# Patient Record
Sex: FEMALE | Race: WHITE | HISPANIC OR LATINO | URBAN - METROPOLITAN AREA
[De-identification: names, ages, dates, MRNs, and addresses within clinical notes are randomized per-mention and may not be internally consistent; named-entity substitution may affect disease eponyms.]

---

## 2023-12-28 ENCOUNTER — OFFICE (OUTPATIENT)
Dept: URBAN - METROPOLITAN AREA CLINIC 34 | Facility: CLINIC | Age: 78
End: 2023-12-28

## 2023-12-28 VITALS
HEIGHT: 57 IN | TEMPERATURE: 97.5 F | DIASTOLIC BLOOD PRESSURE: 68 MMHG | HEART RATE: 80 BPM | WEIGHT: 138 LBS | SYSTOLIC BLOOD PRESSURE: 131 MMHG

## 2023-12-28 DIAGNOSIS — M06.9 RHEUMATOID ARTHRITIS, UNSPECIFIED: ICD-10-CM

## 2023-12-28 DIAGNOSIS — K74.60 UNSPECIFIED CIRRHOSIS OF LIVER: ICD-10-CM

## 2023-12-28 DIAGNOSIS — R74.8 ABNORMAL LEVELS OF OTHER SERUM ENZYMES: ICD-10-CM

## 2023-12-28 DIAGNOSIS — Z90.5 ACQUIRED ABSENCE OF KIDNEY: ICD-10-CM

## 2023-12-28 PROCEDURE — 99204 OFFICE O/P NEW MOD 45 MIN: CPT | Performed by: PHYSICIAN ASSISTANT

## 2024-01-17 ENCOUNTER — OFFICE (OUTPATIENT)
Dept: URBAN - METROPOLITAN AREA CLINIC 102 | Facility: CLINIC | Age: 79
End: 2024-01-17

## 2024-01-17 DIAGNOSIS — R74.8 ABNORMAL LEVELS OF OTHER SERUM ENZYMES: ICD-10-CM

## 2024-01-17 DIAGNOSIS — K74.60 UNSPECIFIED CIRRHOSIS OF LIVER: ICD-10-CM

## 2024-01-17 PROCEDURE — 76981 USE PARENCHYMA: CPT | Performed by: PHYSICIAN ASSISTANT

## 2024-02-22 LAB
ALPHA FETOPROTEIN, TUMOR MARKER: 4.4 NG/ML
ALPHA-1-ANTITRYPSIN QN: 165 MG/DL (ref 83–199)
ANA SCREEN, IFA, W/REFL TITER AND PATTERN: ANA SCREEN, IFA: POSITIVE
ANTINUCLEAR ANTIBODIES   TITER AND PATTERN: ANA PATTERN: ABNORMAL
ANTINUCLEAR ANTIBODIES   TITER AND PATTERN: ANA PATTERN: ABNORMAL
ANTINUCLEAR ANTIBODIES   TITER AND PATTERN: ANA PATTERN: NORMAL
ANTINUCLEAR ANTIBODIES   TITER AND PATTERN: ANA TITER: HIGH TITER
ANTINUCLEAR ANTIBODIES   TITER AND PATTERN: ANA TITER: HIGH TITER
ANTINUCLEAR ANTIBODIES   TITER AND PATTERN: ANA TITER: NORMAL TITER
CBC (INCLUDES DIFF/PLT): ABSOLUTE BAND NEUTROPHILS: NORMAL CELLS/UL
CBC (INCLUDES DIFF/PLT): ABSOLUTE BASOPHILS: 40 CELLS/UL (ref 0–200)
CBC (INCLUDES DIFF/PLT): ABSOLUTE BLASTS: NORMAL CELLS/UL
CBC (INCLUDES DIFF/PLT): ABSOLUTE EOSINOPHILS: 180 CELLS/UL (ref 15–500)
CBC (INCLUDES DIFF/PLT): ABSOLUTE LYMPHOCYTES: 1170 CELLS/UL (ref 850–3900)
CBC (INCLUDES DIFF/PLT): ABSOLUTE METAMYELOCYTES: NORMAL CELLS/UL
CBC (INCLUDES DIFF/PLT): ABSOLUTE MONOCYTES: 364 CELLS/UL (ref 200–950)
CBC (INCLUDES DIFF/PLT): ABSOLUTE MYELOCYTES: NORMAL CELLS/UL
CBC (INCLUDES DIFF/PLT): ABSOLUTE NEUTROPHILS: 1847 CELLS/UL (ref 1500–7800)
CBC (INCLUDES DIFF/PLT): ABSOLUTE NUCLEATED RBC: NORMAL CELLS/UL
CBC (INCLUDES DIFF/PLT): ABSOLUTE PROMYELOCYTES: NORMAL CELLS/UL
CBC (INCLUDES DIFF/PLT): BAND NEUTROPHILS: NORMAL %
CBC (INCLUDES DIFF/PLT): BASOPHILS: 1.1 %
CBC (INCLUDES DIFF/PLT): BLASTS: NORMAL %
CBC (INCLUDES DIFF/PLT): COMMENT(S): NORMAL
CBC (INCLUDES DIFF/PLT): EOSINOPHILS: 5 %
CBC (INCLUDES DIFF/PLT): HEMATOCRIT: 32.2 % — LOW (ref 35–45)
CBC (INCLUDES DIFF/PLT): HEMOGLOBIN: 10.9 G/DL — LOW (ref 11.7–15.5)
CBC (INCLUDES DIFF/PLT): LYMPHOCYTES: 32.5 %
CBC (INCLUDES DIFF/PLT): MCH: 31.2 PG (ref 27–33)
CBC (INCLUDES DIFF/PLT): MCHC: 33.9 G/DL (ref 32–36)
CBC (INCLUDES DIFF/PLT): MCV: 92.3 FL (ref 80–100)
CBC (INCLUDES DIFF/PLT): METAMYELOCYTES: NORMAL %
CBC (INCLUDES DIFF/PLT): MONOCYTES: 10.1 %
CBC (INCLUDES DIFF/PLT): MPV: 11.1 FL (ref 7.5–12.5)
CBC (INCLUDES DIFF/PLT): MYELOCYTES: NORMAL %
CBC (INCLUDES DIFF/PLT): NEUTROPHILS: 51.3 %
CBC (INCLUDES DIFF/PLT): NUCLEATED RBC: NORMAL /100 WBC
CBC (INCLUDES DIFF/PLT): PLATELET COUNT: 190 THOUSAND/UL (ref 140–400)
CBC (INCLUDES DIFF/PLT): PROMYELOCYTES: NORMAL %
CBC (INCLUDES DIFF/PLT): RDW: 12.7 % (ref 11–15)
CBC (INCLUDES DIFF/PLT): REACTIVE LYMPHOCYTES: NORMAL %
CBC (INCLUDES DIFF/PLT): RED BLOOD CELL COUNT: 3.49 MILLION/UL — LOW (ref 3.8–5.1)
CBC (INCLUDES DIFF/PLT): WHITE BLOOD CELL COUNT: 3.6 THOUSAND/UL — LOW (ref 3.8–10.8)
CELIAC DISEASE COMPREHENSIVE PANEL: IMMUNOGLOBULIN A: 374 MG/DL — HIGH (ref 70–320)
CELIAC DISEASE COMPREHENSIVE PANEL: INTERPRETATION: (no result)
CELIAC DISEASE COMPREHENSIVE PANEL: TISSUE TRANSGLUTAMINASE AB, IGA: <1 U/ML
COMPREHENSIVE METABOLIC PANEL: ALBUMIN/GLOBULIN RATIO: 0.8 (CALC) — LOW (ref 1–2.5)
COMPREHENSIVE METABOLIC PANEL: ALBUMIN: 3.9 G/DL (ref 3.6–5.1)
COMPREHENSIVE METABOLIC PANEL: ALKALINE PHOSPHATASE: 95 U/L (ref 37–153)
COMPREHENSIVE METABOLIC PANEL: ALT: 40 U/L — HIGH (ref 6–29)
COMPREHENSIVE METABOLIC PANEL: AST: 69 U/L — HIGH (ref 10–35)
COMPREHENSIVE METABOLIC PANEL: BILIRUBIN, TOTAL: 0.7 MG/DL (ref 0.2–1.2)
COMPREHENSIVE METABOLIC PANEL: BUN/CREATININE RATIO: (no result) (CALC)
COMPREHENSIVE METABOLIC PANEL: CALCIUM: 9.3 MG/DL (ref 8.6–10.4)
COMPREHENSIVE METABOLIC PANEL: CARBON DIOXIDE: 24 MMOL/L (ref 20–32)
COMPREHENSIVE METABOLIC PANEL: CHLORIDE: 104 MMOL/L (ref 98–110)
COMPREHENSIVE METABOLIC PANEL: CREATININE: 0.89 MG/DL (ref 0.6–0.95)
COMPREHENSIVE METABOLIC PANEL: EGFR: 65 ML/MIN/1.73M2 (ref 60–?)
COMPREHENSIVE METABOLIC PANEL: GLOBULIN: 4.8 G/DL (CALC) — HIGH (ref 1.9–3.7)
COMPREHENSIVE METABOLIC PANEL: GLUCOSE: 88 MG/DL (ref 65–99)
COMPREHENSIVE METABOLIC PANEL: POTASSIUM: 4.1 MMOL/L (ref 3.5–5.3)
COMPREHENSIVE METABOLIC PANEL: PROTEIN, TOTAL: 8.7 G/DL — HIGH (ref 6.1–8.1)
COMPREHENSIVE METABOLIC PANEL: SODIUM: 135 MMOL/L (ref 135–146)
COMPREHENSIVE METABOLIC PANEL: UREA NITROGEN (BUN): 13 MG/DL (ref 7–25)
HEPATITIS A AB, TOTAL: REACTIVE
HEPATITIS A IGM: NORMAL
HEPATITIS B CORE AB TOTAL: NORMAL
HEPATITIS B CORE ANTIBODY (IGM): HEPATITIS B CORE IGM ANTIBODY: NORMAL
HEPATITIS B SURFACE AB IMMUNITY, QN: <5 MIU/ML — LOW
HEPATITIS B SURFACE ANTIGEN W/REFL CONFIRM: CONFIRMATION: NORMAL
HEPATITIS B SURFACE ANTIGEN W/REFL CONFIRM: HEPATITIS B SURFACE ANTIGEN: NORMAL
HEPATITIS C AB W/REFL TO HCV RNA, QN, PCR: HEPATITIS C ANTIBODY: NORMAL
IRON, TIBC AND FERRITIN PANEL: % SATURATION: 38 % (CALC) (ref 16–45)
IRON, TIBC AND FERRITIN PANEL: FERRITIN: 85 NG/ML (ref 16–288)
IRON, TIBC AND FERRITIN PANEL: IRON BINDING CAPACITY: 313 MCG/DL (CALC) (ref 250–450)
IRON, TIBC AND FERRITIN PANEL: IRON, TOTAL: 119 MCG/DL (ref 45–160)
LIVER KIDNEY MICROSOME (LKM-1) AB (IGG): LKM-1 ANTIBODY (IGG): <=20 U
MITOCHONDRIAL AB TITER: HIGH TITER
MITOCHONDRIAL ANTIBODY W/REFL TITER: MITOCHONDRIAL AB SCREEN: POSITIVE
PROTHROMBIN TIME-INR: INR: 1.1
PROTHROMBIN TIME-INR: PT: 11.4 SEC (ref 9–11.5)
SMOOTH MUSCLE AB W/REFL TITER: SMOOTH MUSCLE AB SCREEN: NEGATIVE
T4, FREE: 1.1 NG/DL (ref 0.8–1.8)
TSH: 4.83 MIU/L — HIGH (ref 0.4–4.5)

## 2024-04-18 ENCOUNTER — TELEHEALTH PROVIDED OTHER THAN IN PATIENT'S HOME (OUTPATIENT)
Dept: URBAN - METROPOLITAN AREA TELEHEALTH 12 | Facility: TELEHEALTH | Age: 79
End: 2024-04-18

## 2024-04-18 VITALS — HEIGHT: 57 IN | WEIGHT: 138 LBS

## 2024-04-18 DIAGNOSIS — R74.8 ABNORMAL LEVELS OF OTHER SERUM ENZYMES: ICD-10-CM

## 2024-04-18 DIAGNOSIS — M06.9 RHEUMATOID ARTHRITIS, UNSPECIFIED: ICD-10-CM

## 2024-04-18 DIAGNOSIS — K74.60 UNSPECIFIED CIRRHOSIS OF LIVER: ICD-10-CM

## 2024-04-18 PROCEDURE — 99214 OFFICE O/P EST MOD 30 MIN: CPT | Mod: 95 | Performed by: INTERNAL MEDICINE

## 2024-04-18 NOTE — SERVICEHPINOTES
PATIENT VERIFIED BY DATE OF BIRTH AND NAME. Patient has been consented for this telecommunication visit using [employer] application.  Bloodwork revealed AMANDA (1:1280 cytoplasmic) and AMA (1:320) positivity, Hb 10.9, TSH 4.83, normal iron sats.  ASMA, A1A, anti LKM negative. Normal INR and platelets.  Hep B s Ab &lt5 and Hep A total Ab positive.  Normal AFP.   Fibroscan shows cirrhosis.  No encephalopathy, ascites, melena. No digestive complaints.

## 2024-04-18 NOTE — SERVICENOTES
Patient was located in their home during visit., I personally discussed the procedure with the patient, including the risks, benefits, and alternatives of EGD. The procedure will be done under pharmacologic sedation. Risks and potential complications of the procedure include, but not limited to: bleeding, infection, bowel perforation, adverse reaction to anesthetics and missed lesions. Biopsy, dilation and/or maneuvers to control bleeding may be performed. Other alternative diagnostic or therapeutic procedure, such as medication treatment, x-ray, and surgery may be available. Another option is to choose no diagnostic exam and/or treatment., I spent 30 minutes today with the patient for this telehealth visit., Patient's visit was conducted through [employer] video telecommunication. Patient consented before the start of visit as to understanding of privacy concerns, possible technological failure, and their responsibility of carrying out instructions of plan.

## 2025-07-16 ENCOUNTER — NEW PATIENT (OUTPATIENT)
Dept: URBAN - METROPOLITAN AREA CLINIC 66 | Facility: CLINIC | Age: 80
End: 2025-07-16

## 2025-07-16 DIAGNOSIS — H34.8320: ICD-10-CM

## 2025-07-16 DIAGNOSIS — H35.09: ICD-10-CM

## 2025-07-16 DIAGNOSIS — H35.373: ICD-10-CM

## 2025-07-16 DIAGNOSIS — H43.813: ICD-10-CM

## 2025-07-16 DIAGNOSIS — H46.02: ICD-10-CM

## 2025-07-16 PROCEDURE — 92202 OPSCPY EXTND ON/MAC DRAW: CPT | Mod: NC

## 2025-07-16 PROCEDURE — 92250 FUNDUS PHOTOGRAPHY W/I&R: CPT

## 2025-07-16 PROCEDURE — 99204 OFFICE O/P NEW MOD 45 MIN: CPT

## 2025-07-16 PROCEDURE — 92134 CPTRZ OPH DX IMG PST SGM RTA: CPT | Mod: NC

## 2025-07-16 PROCEDURE — 92235 FLUORESCEIN ANGRPH MLTIFRAME: CPT

## 2025-07-16 ASSESSMENT — VISUAL ACUITY
OS_CC: 20/20-2
OD_CC: 20/40-1

## 2025-07-16 ASSESSMENT — TONOMETRY
OD_IOP_MMHG: 17
OS_IOP_MMHG: 17